# Patient Record
Sex: MALE | Race: ASIAN | Employment: OTHER | URBAN - METROPOLITAN AREA
[De-identification: names, ages, dates, MRNs, and addresses within clinical notes are randomized per-mention and may not be internally consistent; named-entity substitution may affect disease eponyms.]

---

## 2020-03-16 ENCOUNTER — OFFICE VISIT (OUTPATIENT)
Dept: ORTHOPEDICS | Facility: CLINIC | Age: 70
End: 2020-03-16

## 2020-03-16 ENCOUNTER — ANCILLARY PROCEDURE (OUTPATIENT)
Dept: GENERAL RADIOLOGY | Facility: CLINIC | Age: 70
End: 2020-03-16
Attending: FAMILY MEDICINE

## 2020-03-16 VITALS
BODY MASS INDEX: 27.09 KG/M2 | WEIGHT: 200 LBS | SYSTOLIC BLOOD PRESSURE: 120 MMHG | DIASTOLIC BLOOD PRESSURE: 80 MMHG | HEIGHT: 72 IN

## 2020-03-16 DIAGNOSIS — M25.532 WRIST PAIN, LEFT: ICD-10-CM

## 2020-03-16 DIAGNOSIS — M25.532 WRIST PAIN, LEFT: Primary | ICD-10-CM

## 2020-03-16 PROCEDURE — 99204 OFFICE O/P NEW MOD 45 MIN: CPT | Performed by: FAMILY MEDICINE

## 2020-03-16 PROCEDURE — 73110 X-RAY EXAM OF WRIST: CPT | Mod: LT

## 2020-03-16 ASSESSMENT — MIFFLIN-ST. JEOR: SCORE: 1710.19

## 2020-03-16 NOTE — PROGRESS NOTES
[facility]   Clinic Visit s Mar 16, 2020    PCP: Ronna, Elpidio Lima    ASSESSMENT/PLAN    ICD-10-CM    1. 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  He is comfortable with this level of management and would not want surgery or therapy at the present time.  Given a wrist brace for use as desired.  May call for therapy referral if he changes his mind.  Otherwise return for reevaluation if pain persistent.          Today's Visit:  Rainer is a 69 year old male who is seen as self referral for   Chief Complaint   Patient presents with     Left Hand - Pain       Injury: Reports insidious onset without acute precipitating event.     Right hand dominant    Location of Pain: left wrist ulnar, nonradiating   Duration of Pain: chronic worse in the last year ,   Rating of Pain at worst: 7/10  Rating of Pain Currently: 0/10  Pain is better with: activity avoidance   Pain is worse with:  lifting, gripping, ulnar deviation  Treatment so far consists of: wrap, brace, heat  Associated symptoms: no distal numbness or tingling; denies swelling or warmth  Recent imaging completed: none.  Prior History of related problems: in the 70's, original injury from digging in the garden    Social History: retired   PMH, PSHX, FMHX, SOCHX all reviewed and are unremarkable or noncontributory to today's visit.  See intake form for details.      Review of Systems   Musculoskeletal: Positive for joint pain.   All other systems reviewed and are negative.        Physical Exam  /80   Ht 1.829 m (6')   Wt 90.7 kg (200 lb)   BMI 27.12 kg/m    Constitutional:well-developed, well-nourished, and in no distress.   Cardiovascular: Intact distal pulses.    Neurological: alert. Gait Normal:   Gait, station, stance, and balance appear normal for age  Skin: Skin is warm and  dry.   Psychiatric: Mood and affect normal.   Respiratory: unlabored, speaks in full sentences  Lymph: no LAD, no lymphangitis          Hand/Wrist Musculoskeletal Exam    Inspection      Inspection - Left         Erythema: none      Ecchymosis: none      Edema: none    Palpation      Palpation - Left         Wrist tenderness to palpation: triangle fibrocartilage complex    Range of Motion      Range of Motion - Left Wrist         Left wrist range of motion is normal.    Strength      Strength - Left Wrist         Left wrist strength is normal.    Neurovascular      Neurovascular - Right         Right neurovascular exam is normal.    Special Tests      Special Tests - Left        TFCC load test: positive      X-ray images Ordered and independently reviewed by me in the office today with the patient. X-ray shows:     Recent Results (from the past 744 hour(s))   XR Wrist Left G/E 3 Views    Narrative    XR WRIST LEFT G/E 3 VIEWS 3/16/2020 8:40 AM     HISTORY: Wrist pain, left      Impression    IMPRESSION: Ulnar styloid thinly corticated erosion at the distal tip.  This is nonspecific and of uncertain significance. Radiocarpal and  midcarpal joint space widths appear within normal limits.    CONNIE PIPER MD

## 2020-03-16 NOTE — LETTER
3/16/2020         RE: Rainer Brink  [address]  [place] [address]        Dear Colleague,    Thank you for referring your patient, Rainer Brink, to the  SPORTS MEDICINE. Please see a copy of my visit note below.    [facility]   Clinic Visit s Mar 16, 2020    PCP: Elpidio Friend    ASSESSMENT/PLAN    ICD-10-CM    1. 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  He is comfortable with this level of management and would not want surgery or therapy at the present time.  Given a wrist brace for use as desired.  May call for therapy referral if he changes his mind.  Otherwise return for reevaluation if pain persistent.          Today's Visit:  Rainer is a 69 year old male who is seen as self referral for   Chief Complaint   Patient presents with     Left Hand - Pain       Injury: Reports insidious onset without acute precipitating event.     Right hand dominant    Location of Pain: left wrist ulnar, nonradiating   Duration of Pain: chronic worse in the last year ,   Rating of Pain at worst: 7/10  Rating of Pain Currently: 0/10  Pain is better with: activity avoidance   Pain is worse with:  lifting, gripping, ulnar deviation  Treatment so far consists of: wrap, brace, heat  Associated symptoms: no distal numbness or tingling; denies swelling or warmth  Recent imaging completed: none.  Prior History of related problems: in the 70's, original injury from digging in the garden    Social History: retired   PMH, PSHX, FMHX, SOCHX all reviewed and are unremarkable or noncontributory to today's visit.  See intake form for details.      Review of Systems   Musculoskeletal: Positive for joint pain.   All other systems reviewed and are negative.        Physical Exam  /80   Ht 1.829 m (6')   Wt 90.7 kg (200 lb)   BMI 27.12 kg/m     Constitutional:well-developed, well-nourished, and in no distress.   Cardiovascular: Intact distal pulses.    Neurological: alert. Gait Normal:   Gait, station, stance, and balance appear normal for age  Skin: Skin is warm and dry.   Psychiatric: Mood and affect normal.   Respiratory: unlabored, speaks in full sentences  Lymph: no LAD, no lymphangitis          Hand/Wrist Musculoskeletal Exam    Inspection      Inspection - Left         Erythema: none      Ecchymosis: none      Edema: none    Palpation      Palpation - Left         Wrist tenderness to palpation: triangle fibrocartilage complex    Range of Motion      Range of Motion - Left Wrist         Left wrist range of motion is normal.    Strength      Strength - Left Wrist         Left wrist strength is normal.    Neurovascular      Neurovascular - Right         Right neurovascular exam is normal.    Special Tests      Special Tests - Left        TFCC load test: positive      X-ray images Ordered and independently reviewed by me in the office today with the patient. X-ray shows:     Recent Results (from the past 744 hour(s))   XR Wrist Left G/E 3 Views    Narrative    XR WRIST LEFT G/E 3 VIEWS 3/16/2020 8:40 AM     HISTORY: Wrist pain, left      Impression    IMPRESSION: Ulnar styloid thinly corticated erosion at the distal tip.  This is nonspecific and of uncertain significance. Radiocarpal and  midcarpal joint space widths appear within normal limits.    CONNIE PIPER MD           Again, thank you for allowing me to participate in the care of your patient.        Sincerely,        Gonsalo Malin MD

## 2020-07-17 ENCOUNTER — VIRTUAL VISIT (OUTPATIENT)
Dept: FAMILY MEDICINE | Facility: CLINIC | Age: 70
End: 2020-07-17

## 2020-07-17 DIAGNOSIS — R60.9 EDEMA, UNSPECIFIED TYPE: ICD-10-CM

## 2020-07-17 DIAGNOSIS — R53.83 FATIGUE, UNSPECIFIED TYPE: ICD-10-CM

## 2020-07-17 DIAGNOSIS — M79.10 MYALGIA: Primary | ICD-10-CM

## 2020-07-17 PROCEDURE — 99203 OFFICE O/P NEW LOW 30 MIN: CPT | Mod: 95 | Performed by: NURSE PRACTITIONER

## 2020-07-17 NOTE — NURSING NOTE
Please abstract the following data from this visit with this patient into the appropriate field in Epic:    Tests that can be patient reported without a hard copy:    Colonoscopy done on this date: 2010 (approximately), by this group: [facility], results were normal.     Other Tests found in the patient's chart through Chart Review/Care Everywhere:        Note to Abstraction: If this section is blank, no results were found via Chart Review/Care Everywhere.      Brooklyn Carver MA

## 2020-07-17 NOTE — Clinical Note
Please abstract the following data from this visit with this patient into the appropriate field in Epic:    Tests that can be patient reported without a hard copy:    Colonoscopy done on this date: 2010 (approximately), by this group: [facility], results were normal.     Other Tests found in the patient's chart through Chart Review/Care Everywhere:        Note to Abstraction: If this section is blank, no results were found via Chart Review/Care Everywhere.  Brooklyn Carver MA

## 2020-07-17 NOTE — PROGRESS NOTES
"Rainer Brink is a 69 year old male who is being evaluated via a billable video visit.      The patient has been notified of following:     \"This video visit will be conducted via a call between you and your physician/provider. We have found that certain health care needs can be provided without the need for an in-person physical exam.  This service lets us provide the care you need with a video conversation.  If a prescription is necessary we can send it directly to your pharmacy.  If lab work is needed we can place an order for that and you can then stop by our lab to have the test done at a later time.    Video visits are billed at different rates depending on your insurance coverage.  Please reach out to your insurance provider with any questions.    If during the course of the call the physician/provider feels a video visit is not appropriate, you will not be charged for this service.\"    Patient has given verbal consent for Video visit? Yes  How would you like to obtain your AVS? MyChart  If you are dropped from the video visit, the video invite should be resent to: Text to cell phone: [phone]  Will anyone else be joining your video visit? No      Subjective     Rainer Brink is a 69 year old male who presents today via video visit for the following health issues:    HPI    Muscle aches      Duration: since April;     Description (location/character/radiation): all 4 extremeties    Intensity:  moderate, severe    Accompanying signs and symptoms: fatigue    History (similar episodes/previous evaluation): does not recall a tick bite    Precipitating or alleviating factors: None    Therapies tried and outcome: None          Video Start Time: 8:59    [facility] He has no chronic illness and is not on any medication.  He is used to taking a walk daily but for the past 3 months has just had so much muscle/leg fatigue and hands and feet swelling that he has not been able " to do the walks.  He denies fever or chills and has no chest pain or SOB.   He does not know of any tick bites but has been reading and thinks that he should have a lyme test.  Has not had EM rash     There is no problem list on file for this patient.    Past Surgical History:   Procedure Laterality Date     HERNIA REPAIR  2014       Social History     Tobacco Use     Smoking status: Former Smoker     Packs/day: 2.00     Years: 20.00     Pack years: 40.00     Types: Cigarettes     Smokeless tobacco: Never Used   Substance Use Topics     Alcohol use: Yes     Comment: 2 glasses of wine per day     History reviewed. No pertinent family history.      Current Outpatient Medications   Medication Sig Dispense Refill     order for DME Equipment being ordered: wrist quick fit left 1 Device 0     No Known Allergies    Reviewed and updated as needed this visit by Provider       Review of Systems   Constitutional, HEENT, cardiovascular, pulmonary, GI, , musculoskeletal, neuro, skin, endocrine and psych systems are negative, except as otherwise noted.      Objective       No VS for this virtual visit      Physical Exam     GENERAL: Healthy, alert and no distress  EYES: Eyes grossly normal to inspection.  No discharge or erythema, or obvious scleral/conjunctival abnormalities.  RESP: No audible wheeze, cough, or visible cyanosis.  No visible retractions or increased work of breathing.    SKIN: Visible skin clear. No significant rash, abnormal pigmentation or lesions.  NEURO: Cranial nerves grossly intact.  Mentation and speech appropriate for age.  PSYCH: Mentation appears normal, affect normal/bright, judgement and insight intact, normal speech and appearance well-groomed.      Diagnostic Test Results:  Labs reviewed in Epic        Assessment & Plan       ICD-10-CM    1. Myalgia  M79.10    2. Fatigue, unspecified type  R53.83    3. Edema, unspecified type  R60.9      I have advised Mr Brink to  be seen in person for the  testing that is required to properly evaluate his condition.    He is in agreement and [facility]  He is advised that we do also have F2F visits at this clinic           RAMILA Coyle CNP  [facility]      Video-Visit Details    Type of service:  Video Visit    Video End Time:9:07    Originating Location (pt. Location): Home    Distant Location (provider location):  [facility]     Platform used for Video Visit: DoxRAMILA Whitman CNP

## 2021-01-15 ENCOUNTER — HEALTH MAINTENANCE LETTER (OUTPATIENT)
Age: 71
End: 2021-01-15

## 2021-09-04 ENCOUNTER — HEALTH MAINTENANCE LETTER (OUTPATIENT)
Age: 71
End: 2021-09-04

## 2022-02-19 ENCOUNTER — HEALTH MAINTENANCE LETTER (OUTPATIENT)
Age: 72
End: 2022-02-19

## 2022-10-16 ENCOUNTER — HEALTH MAINTENANCE LETTER (OUTPATIENT)
Age: 72
End: 2022-10-16

## 2023-04-01 ENCOUNTER — HEALTH MAINTENANCE LETTER (OUTPATIENT)
Age: 73
End: 2023-04-01